# Patient Record
Sex: MALE | NOT HISPANIC OR LATINO | Employment: OTHER | URBAN - METROPOLITAN AREA
[De-identification: names, ages, dates, MRNs, and addresses within clinical notes are randomized per-mention and may not be internally consistent; named-entity substitution may affect disease eponyms.]

---

## 2023-03-03 ENCOUNTER — HOSPITAL ENCOUNTER (INPATIENT)
Facility: HOSPITAL | Age: 68
LOS: 1 days | Discharge: LEFT BEFORE DISCHARGE | DRG: 565 | End: 2023-03-03
Attending: INTERNAL MEDICINE | Admitting: INTERNAL MEDICINE
Payer: MEDICARE

## 2023-03-03 VITALS
DIASTOLIC BLOOD PRESSURE: 65 MMHG | RESPIRATION RATE: 18 BRPM | OXYGEN SATURATION: 99 % | BODY MASS INDEX: 22.65 KG/M2 | HEART RATE: 67 BPM | HEIGHT: 71 IN | TEMPERATURE: 97.6 F | WEIGHT: 161.8 LBS | SYSTOLIC BLOOD PRESSURE: 163 MMHG

## 2023-03-03 PROBLEM — R78.81 MRSA BACTEREMIA: Status: ACTIVE | Noted: 2023-03-03

## 2023-03-03 PROBLEM — M86.9 OSTEOMYELITIS: Status: ACTIVE | Noted: 2023-03-03

## 2023-03-03 PROBLEM — Z72.0 TOBACCO ABUSE: Chronic | Status: ACTIVE | Noted: 2023-03-03

## 2023-03-03 PROBLEM — B95.62 MRSA BACTEREMIA: Status: ACTIVE | Noted: 2023-03-03

## 2023-03-03 PROBLEM — E11.21 DIABETES MELLITUS WITH NEPHROPATHY: Chronic | Status: ACTIVE | Noted: 2023-03-03

## 2023-03-03 PROBLEM — E87.1 HYPONATREMIA: Status: ACTIVE | Noted: 2023-03-03

## 2023-03-03 LAB
ALBUMIN SERPL-MCNC: 2.6 G/DL (ref 3.5–5.2)
ALBUMIN/GLOB SERPL: 0.6 G/DL
ALP SERPL-CCNC: 148 U/L (ref 39–117)
ALT SERPL W P-5'-P-CCNC: 5 U/L (ref 1–41)
ANION GAP SERPL CALCULATED.3IONS-SCNC: 9 MMOL/L (ref 5–15)
AST SERPL-CCNC: 18 U/L (ref 1–40)
BASOPHILS # BLD AUTO: 0.02 10*3/MM3 (ref 0–0.2)
BASOPHILS NFR BLD AUTO: 0.4 % (ref 0–1.5)
BILIRUB SERPL-MCNC: 0.3 MG/DL (ref 0–1.2)
BUN SERPL-MCNC: 20 MG/DL (ref 8–23)
BUN/CREAT SERPL: 15.7 (ref 7–25)
CALCIUM SPEC-SCNC: 8.4 MG/DL (ref 8.6–10.5)
CHLORIDE SERPL-SCNC: 95 MMOL/L (ref 98–107)
CO2 SERPL-SCNC: 25 MMOL/L (ref 22–29)
CREAT SERPL-MCNC: 1.27 MG/DL (ref 0.76–1.27)
CRP SERPL-MCNC: 10.73 MG/DL (ref 0–0.5)
DEPRECATED RDW RBC AUTO: 47.5 FL (ref 37–54)
EGFRCR SERPLBLD CKD-EPI 2021: 61.5 ML/MIN/1.73
EOSINOPHIL # BLD AUTO: 0.01 10*3/MM3 (ref 0–0.4)
EOSINOPHIL NFR BLD AUTO: 0.2 % (ref 0.3–6.2)
ERYTHROCYTE [DISTWIDTH] IN BLOOD BY AUTOMATED COUNT: 16.6 % (ref 12.3–15.4)
ERYTHROCYTE [SEDIMENTATION RATE] IN BLOOD: 121 MM/HR (ref 0–20)
GLOBULIN UR ELPH-MCNC: 4.4 GM/DL
GLUCOSE BLDC GLUCOMTR-MCNC: 354 MG/DL (ref 70–130)
GLUCOSE SERPL-MCNC: 322 MG/DL (ref 65–99)
HBA1C MFR BLD: 10.8 % (ref 4.8–5.6)
HCT VFR BLD AUTO: 30 % (ref 37.5–51)
HGB BLD-MCNC: 9.6 G/DL (ref 13–17.7)
IMM GRANULOCYTES # BLD AUTO: 0.02 10*3/MM3 (ref 0–0.05)
IMM GRANULOCYTES NFR BLD AUTO: 0.4 % (ref 0–0.5)
INR PPP: 1.01 (ref 0.84–1.13)
LYMPHOCYTES # BLD AUTO: 1.4 10*3/MM3 (ref 0.7–3.1)
LYMPHOCYTES NFR BLD AUTO: 30.6 % (ref 19.6–45.3)
MCH RBC QN AUTO: 25.3 PG (ref 26.6–33)
MCHC RBC AUTO-ENTMCNC: 32 G/DL (ref 31.5–35.7)
MCV RBC AUTO: 79.2 FL (ref 79–97)
MONOCYTES # BLD AUTO: 0.45 10*3/MM3 (ref 0.1–0.9)
MONOCYTES NFR BLD AUTO: 9.8 % (ref 5–12)
NEUTROPHILS NFR BLD AUTO: 2.67 10*3/MM3 (ref 1.7–7)
NEUTROPHILS NFR BLD AUTO: 58.6 % (ref 42.7–76)
NRBC BLD AUTO-RTO: 0 /100 WBC (ref 0–0.2)
PLATELET # BLD AUTO: 281 10*3/MM3 (ref 140–450)
PMV BLD AUTO: 9.8 FL (ref 6–12)
POTASSIUM SERPL-SCNC: 4.6 MMOL/L (ref 3.5–5.2)
PROT SERPL-MCNC: 7 G/DL (ref 6–8.5)
PROTHROMBIN TIME: 13.2 SECONDS (ref 11.4–14.4)
RBC # BLD AUTO: 3.79 10*6/MM3 (ref 4.14–5.8)
SODIUM SERPL-SCNC: 129 MMOL/L (ref 136–145)
WBC NRBC COR # BLD: 4.57 10*3/MM3 (ref 3.4–10.8)

## 2023-03-03 PROCEDURE — 99222 1ST HOSP IP/OBS MODERATE 55: CPT | Performed by: INTERNAL MEDICINE

## 2023-03-03 PROCEDURE — 82962 GLUCOSE BLOOD TEST: CPT

## 2023-03-03 PROCEDURE — 83036 HEMOGLOBIN GLYCOSYLATED A1C: CPT | Performed by: INTERNAL MEDICINE

## 2023-03-03 PROCEDURE — 85025 COMPLETE CBC W/AUTO DIFF WBC: CPT | Performed by: INTERNAL MEDICINE

## 2023-03-03 PROCEDURE — 87040 BLOOD CULTURE FOR BACTERIA: CPT | Performed by: INTERNAL MEDICINE

## 2023-03-03 PROCEDURE — 85652 RBC SED RATE AUTOMATED: CPT | Performed by: INTERNAL MEDICINE

## 2023-03-03 PROCEDURE — 85610 PROTHROMBIN TIME: CPT | Performed by: INTERNAL MEDICINE

## 2023-03-03 PROCEDURE — 86140 C-REACTIVE PROTEIN: CPT | Performed by: INTERNAL MEDICINE

## 2023-03-03 PROCEDURE — 80053 COMPREHEN METABOLIC PANEL: CPT | Performed by: INTERNAL MEDICINE

## 2023-03-03 RX ORDER — NICOTINE POLACRILEX 4 MG
15 LOZENGE BUCCAL
Status: DISCONTINUED | OUTPATIENT
Start: 2023-03-03 | End: 2023-03-03 | Stop reason: HOSPADM

## 2023-03-03 RX ORDER — ACETAMINOPHEN 650 MG/1
650 SUPPOSITORY RECTAL EVERY 4 HOURS PRN
Status: DISCONTINUED | OUTPATIENT
Start: 2023-03-03 | End: 2023-03-03 | Stop reason: HOSPADM

## 2023-03-03 RX ORDER — BISACODYL 10 MG
10 SUPPOSITORY, RECTAL RECTAL DAILY PRN
Status: DISCONTINUED | OUTPATIENT
Start: 2023-03-03 | End: 2023-03-03 | Stop reason: HOSPADM

## 2023-03-03 RX ORDER — SODIUM CHLORIDE 9 MG/ML
40 INJECTION, SOLUTION INTRAVENOUS AS NEEDED
Status: DISCONTINUED | OUTPATIENT
Start: 2023-03-03 | End: 2023-03-03 | Stop reason: HOSPADM

## 2023-03-03 RX ORDER — SODIUM CHLORIDE 0.9 % (FLUSH) 0.9 %
10 SYRINGE (ML) INJECTION EVERY 12 HOURS SCHEDULED
Status: DISCONTINUED | OUTPATIENT
Start: 2023-03-03 | End: 2023-03-03 | Stop reason: HOSPADM

## 2023-03-03 RX ORDER — ONDANSETRON 2 MG/ML
4 INJECTION INTRAMUSCULAR; INTRAVENOUS EVERY 6 HOURS PRN
Status: DISCONTINUED | OUTPATIENT
Start: 2023-03-03 | End: 2023-03-03 | Stop reason: HOSPADM

## 2023-03-03 RX ORDER — ACETAMINOPHEN 160 MG/5ML
650 SOLUTION ORAL EVERY 4 HOURS PRN
Status: DISCONTINUED | OUTPATIENT
Start: 2023-03-03 | End: 2023-03-03 | Stop reason: HOSPADM

## 2023-03-03 RX ORDER — POLYETHYLENE GLYCOL 3350 17 G/17G
17 POWDER, FOR SOLUTION ORAL DAILY PRN
Status: DISCONTINUED | OUTPATIENT
Start: 2023-03-03 | End: 2023-03-03 | Stop reason: HOSPADM

## 2023-03-03 RX ORDER — ALBUTEROL SULFATE 2.5 MG/3ML
0.63 SOLUTION RESPIRATORY (INHALATION) EVERY 6 HOURS PRN
Status: DISCONTINUED | OUTPATIENT
Start: 2023-03-03 | End: 2023-03-03 | Stop reason: HOSPADM

## 2023-03-03 RX ORDER — ACETAMINOPHEN 325 MG/1
650 TABLET ORAL EVERY 4 HOURS PRN
Status: DISCONTINUED | OUTPATIENT
Start: 2023-03-03 | End: 2023-03-03 | Stop reason: HOSPADM

## 2023-03-03 RX ORDER — CLOPIDOGREL BISULFATE 75 MG/1
75 TABLET ORAL DAILY
COMMUNITY

## 2023-03-03 RX ORDER — SODIUM CHLORIDE 9 MG/ML
75 INJECTION, SOLUTION INTRAVENOUS CONTINUOUS
Status: DISCONTINUED | OUTPATIENT
Start: 2023-03-03 | End: 2023-03-03 | Stop reason: HOSPADM

## 2023-03-03 RX ORDER — INSULIN LISPRO 100 [IU]/ML
0-7 INJECTION, SOLUTION INTRAVENOUS; SUBCUTANEOUS
Status: DISCONTINUED | OUTPATIENT
Start: 2023-03-03 | End: 2023-03-03 | Stop reason: HOSPADM

## 2023-03-03 RX ORDER — NICOTINE 21 MG/24HR
1 PATCH, TRANSDERMAL 24 HOURS TRANSDERMAL DAILY PRN
Status: DISCONTINUED | OUTPATIENT
Start: 2023-03-03 | End: 2023-03-03 | Stop reason: HOSPADM

## 2023-03-03 RX ORDER — AMOXICILLIN 250 MG
2 CAPSULE ORAL 2 TIMES DAILY
Status: DISCONTINUED | OUTPATIENT
Start: 2023-03-03 | End: 2023-03-03 | Stop reason: HOSPADM

## 2023-03-03 RX ORDER — BISACODYL 5 MG/1
5 TABLET, DELAYED RELEASE ORAL DAILY PRN
Status: DISCONTINUED | OUTPATIENT
Start: 2023-03-03 | End: 2023-03-03 | Stop reason: HOSPADM

## 2023-03-03 RX ORDER — ONDANSETRON 4 MG/1
4 TABLET, FILM COATED ORAL EVERY 6 HOURS PRN
Status: DISCONTINUED | OUTPATIENT
Start: 2023-03-03 | End: 2023-03-03 | Stop reason: HOSPADM

## 2023-03-03 RX ORDER — SODIUM CHLORIDE 0.9 % (FLUSH) 0.9 %
10 SYRINGE (ML) INJECTION AS NEEDED
Status: DISCONTINUED | OUTPATIENT
Start: 2023-03-03 | End: 2023-03-03 | Stop reason: HOSPADM

## 2023-03-03 RX ORDER — DEXTROSE MONOHYDRATE 25 G/50ML
25 INJECTION, SOLUTION INTRAVENOUS
Status: DISCONTINUED | OUTPATIENT
Start: 2023-03-03 | End: 2023-03-03 | Stop reason: HOSPADM

## 2023-03-03 NOTE — PROGRESS NOTES
"Pharmacy Consult-Vancomycin Dosing  Paulo Gonzalez is a  67 y.o. male receiving vancomycin therapy.     Indication: Bacteremia  Consulting Provider: Hospitalist  ID Consult: Yes    Goal AUC: 400 - 600 mg/L*hr    Current Antimicrobial Therapy  Anti-Infectives (From admission, onward)      Ordered     Dose/Rate Route Frequency Start Stop    03/03/23 1726  vancomycin 1500 mg/500 mL 0.9% NS IVPB ([facility])        Ordering Provider: Esteban Rivera RPH    20 mg/kg × 73.4 kg Intravenous Once 03/03/23 1815 03/03/23 1721  Pharmacy to dose vancomycin        Ordering Provider: Clinton Barahona,      Does not apply Continuous PRN 03/03/23 1721 03/08/23 1720            Allergies  Allergies as of 03/03/2023    (No Known Allergies)       Labs    Results from last 7 days   Lab Units 03/03/23  1529   BUN mg/dL 20   CREATININE mg/dL 1.27       Results from last 7 days   Lab Units 03/03/23  1529   WBC 10*3/mm3 4.57       Evaluation of Dosing     Last Dose Received in the ED/Outside Facility: None  Is Patient on Dialysis or Renal Replacement: No    Ht - 180.3 cm (71\")  Wt - 73.4 kg (161 lb 12.8 oz)    Estimated Creatinine Clearance: 57.8 mL/min (by C-G formula based on SCr of 1.27 mg/dL).    Intake & Output (last 3 days)       None            Microbiology and Radiology  Microbiology Results (last 10 days)       ** No results found for the last 240 hours. **            Reported Vancomycin Levels                         Insight[facility] AUC Calculation:    Current AUC:   N/A mg/L*hr    Predicted Steady State AUC on Current Dose: N/A mg/L*hr  _________________________________    Predicted Steady State AUC on New Dose:   488 mg/L*hr    Assessment/Plan:  1. Pharmacy to dose vancomycin for bacteremia.  2. Patient received a loading dose of vancomycin 1500 mg (20.4 mg/kg), and was started on a maintenance dose of 1250 mg (17 mg/kg) Q24H.  3. Vancomycin random scheduled for 3/6 @ 0600.   4. Monitor renal function, cultures and sensitivities, " and clinical status, and adjust regimen as necessary.    Thank you,    Esteban Rivera [facility]  3/3/2023  17:31 EST

## 2023-03-03 NOTE — NURSING NOTE
ACC REVIEW REPORT: Psychiatric        PATIENT NAME: Paulo Gonzalez    PATIENT ID: [id number]        COVID-19 ACC SCREENING       DOES THE PATIENT HAVE A FEVER GREATER THAN OR EQUAL .4: NO    IS THE PATIENT EXPERIENCING SHORTNESS OF BREATH: NO    DOES THE PATIENT HAVE A COUGH: NO  DOES THE PATIENT HAVE ANY OF THE FOLLOWING RISK FACTORS:    EXPOSURE TO SUSPECTED OR KNOWN COVID-19: NO    RECENT TRAVEL HISTORY TO ENDEMIC AREA (DOMESTIC/LOCAL): NO    IS THE PATIENT A HEALTHCARE WORKER: NO    HAS THE PATIENT EXPERIENCED A LOSS OF SENSE OF TASTE OR SMELL: NO    HAS THE PATIENT BEEN TESTED FOR COVID-19: YES    DATE TESTED: February 19TH 2023     LAB TESTING SENT TO:           BED: S373    BED TYPE: TELE    BED GIVEN TO: TRUPTI PEÑA RN    TIME BED GIVEN:     TODAY'S DATE: 3/3/2023    TRANSFER DATE: 03/03/2023    ETA:     TRANSFERRING FACILITY: [facility], [place]    TRANSFERRING FACILITY PHONE # : [phone]    TRANSFERRING PROVIDER: MANDIE RODGERS    DATE/TIME REQUEST RECEIVED: 03/03/2023     [facility] RN: AJ STEPHENSON    REPORT FROM: TRUPTI PEÑA RN     TIME REPORT TAKEN: 805    DIAGNOSIS: OSTEOMYELITIS, RIGHT FOOT    REASON FOR TRANSFER TO [facility]: HIGHER LEVEL CARE    TRANSPORTATION: PATIENT IS COMING BY CAR.     CLINICAL REASON FOR TRANSFER TO [facility]: ADMITTED LAST NIGHT ABOUT 1800 TO [facility], [place] FROM [facility].  PATIENT WANTED TO GO TO [facility] BUT THEY HAD NO BEDS.  HE HAS DIABETIC FOOT ULCERS ON BOTH FEET BUT THE RIGHT IS WORSE. DESCRIPTIONS OF THE ULCERS ARE LISTED BELOW. WOUND CARE TREATED BOTH FEET THIS MORNING.              HE IS A&O X 4 AND A FULL CODE.      HX: ESBL IN URINE, PAD, COPD, HTN., CAD, DIABETES.      HE IS COMING BY CAR.       CLINICAL INFORMATION    HEIGHT:     WEIGHT: 69.4 KG    ALLERGIES: NKDA    INFECTIOUS DISEASE: HISTORY OF HAVING ESBL IN HIS URINE.      ISOLATION:     VITAL SIGNS:   TIME: 0820  TEMP: 98.4  PULSE: 72  B/P: 189/73,   EARLIER IT /85, @ 0832, IT WAS  162/89  RESP: 20:        100% ON RMA        LAB INFORMATION: WBC-5.53, CREAT-1.43, HGB-8.8, GLUCOSE-248.     CULTURE INFORMATION:     MEDS/IV FLUIDS: # 18 LEFT AC/ NS @ 75 M./HR      CARDIAC SYSTEM:    CHEST PAIN:     RATE:     SCALE:     RHYTHM:  UNKNOWN, HE IS NOT ON TELE, EKG ON ADMISSION IS UNAVAILABLE.      Is patient taking or has patient been given any drugs that could increase bleeding? YES    DRUG:  PLAVIX 75 MG          CARDIAC NOTES: NO EKG AVAILABLE AND HE IS NOT ON TELE THERE AND [facility].        RESPIRATORY SYSTEM:    LUNG SOUNDS: CLEAR, LAST CXR WAS ON February 19TH AT [facility][note 3] AND IT WAS NORMAL.     OXYGEN: NO, RMA    O2 SAT: 100% ON RMA    IRESPIRATORY STATUS:  BREATHING EASY AND UNLABORED      CNS/MUSCULOSKELETAL    ALERT AND ORIENTED:    PERSON: YES  PLACE: YES  TIME: YES    INJURY:  WHERE:     CAT SCAN RESULTS:     MRI RESULTS:     CNS/MUSCULOSKELETAL NOTES:       GI//GY  NO BM FOR 1 WEEK, PER REPORT.    ABDOMINAL PAIN:     VOMITING:     DIARRHEA:     NAUSEA:     BOWEL SOUNDS:     OCCULT STOOL:     HEMATURIA:     NG TUBE:    SIZE:     DATE INSERTED:       ULTRASOUND RESULTS:     ACUTE ABDOMEN RESULTS:     CT SCAN RESULTS:       GI//GY NOTES:     MONTERROSO:     PAST MEDICAL HISTORY: DIABETES, CAD, HTN., PAD, COPD, DIABETIC ULCERS    OTHER SYMPTOM NOTES: RIGHT FOOT:  ALL HIS TOES ARE AMPUTATED AND HIS MID-FOOT IS GONE.  THERE IS A LARGE ULCER AT THE AMPUTATION SITE.  THEY ARE USING MONA AND MAXASORB (?) AND COVERING IT WITH A STERILE DRESSING.    LEFT FOOT:  OPEN ULCERS ON EVERY TOE AS WELL AS UNDERNEATH THE TOES.  WOUND CARE IS TREATING IT WITH BETADINE.  WOUND CARE WAS IN THIS MORNING AND TREATED BOTH FEET.    ADDITIONAL NOTES:   HE HAD PITTING EDEMA IN BOTH FEET, PULSES ARE PALPABLE.              Catalina Roman RN  3/3/2023  07:49 EST

## 2023-03-03 NOTE — PLAN OF CARE
"Goal Outcome Evaluation:                   Patient is highly agitated, he is aggressive, demanding a \"doctor\" to come see him about his leg, hospitalitis has already seen this patient, he is demanding to leave.    "

## 2023-03-03 NOTE — H&P
[facility] Medicine Services  HISTORY AND PHYSICAL    Patient Name: Paulo Gonzalez  : 1955  MRN: [id number]  Primary Care Physician: Dionna, No Known  Date of admission: 3/3/2023      Subjective   Subjective     Chief Complaint:  Transfer for osteomyelitis, MRSA bacteremia    HPI:  Paulo Gonzalez is a 67 y.o. male w/ DM2 (with peripheral neuropathy), PAD s/p prior RT TMA, HTN, COPD with ongoing tobacco use, poor healing of RT foot surgical site who was transferred from [facility] for evaluation of bacteremia/osteomyelitis.  The patient has been walking on his right foot without modification who initially presented to [facility] ED early February with fever/malaise, blood work was drawn and he was admitted for dehydration but left AMA prior to completion of admission.  Blood cultures at that time returned positive for MRSA in both sets, it appears the patient was never contacted.  He followed up with his podiatrist late February and was recommended to return to the ED, he refused despite ongoing fever and chills.  Per the chart he was contacted by his wound care clinic 2-3 days ago to schedule a dose of Dalvance, data deficit, and the patient was notified of his positive blood cultures.  He subsequently presented to OSH ED with XR of the RT foot suggestive of osteomyelitis.  He was given a dose of ceftaroline and transfer was requested.  The patient states he would rather die from sepsis than have a BKA.    Review of Systems   Gen- No fevers, chills  CV- No chest pain, palpitations  Resp- No cough, dyspnea  GI- No N/V/D, abd pain    Personal History     Past Medical History:   Diagnosis Date   • COPD (chronic obstructive pulmonary disease) ([facility])    • Diabetes mellitus (HCC)    • Hypertension    • PAD (peripheral artery disease) ([facility])              Past Surgical History:   Procedure Laterality Date   • TRANS METATARSAL AMPUTATION Right        Family History: family history not  felt contributory to current complaint    Social History:  reports that he has been smoking cigarettes. He does not have any smokeless tobacco history on file. He reports that he does not currently use alcohol. He reports that he does not use drugs.  Social History     Social History Narrative   • Not on file       Medications:  Available home medication information reviewed.  Medications Prior to Admission   Medication Sig Dispense Refill Last Dose   • clopidogrel (PLAVIX) 75 MG tablet Take 1 tablet by mouth Daily.      • metFORMIN (GLUCOPHAGE) 500 MG tablet Take 1 tablet by mouth 2 (Two) Times a Day With Meals.          No Known Allergies    Objective   Objective     Vital Signs:   Temp:  [97.6 °F (36.4 °C)] 97.6 °F (36.4 °C)  Heart Rate:  [63] 63  Resp:  [18] 18  BP: (144)/(64) 144/64       Physical Exam   Constitutional: Awake, alert, sitting up in bed in NAD  HENT: NCAT, mucous membranes moist  Respiratory: Clear to auscultation bilaterally, respiratory effort normal   Cardiovascular: RRR, palpable radial pulses  Gastrointestinal: Positive bowel sounds, soft, nontender, nondistended  Musculoskeletal: Prior RT TMA, large wound w/ some erythema  Psychiatric: Appropriate affect, cooperative  Neurologic: Speech clear and fluent, hard of hearing    Result Review:  I have personally reviewed the results from the time of this admission to 3/3/2023 13:23 EST and agree with these findings:  []  Laboratory list / accordion  []  Microbiology  []  Radiology  []  EKG/Telemetry   []  Cardiology/Vascular   []  Pathology  [x]  Old records  []  Other:  Most notable findings include: records from OSH reviewed        LAB RESULTS:                                  Microbiology Results (last 10 days)     ** No results found for the last 240 hours. **          No radiology results from the last 24 hrs        Assessment & Plan   Assessment & Plan     Active Hospital Problems    Diagnosis  POA   • **MRSA bacteremia [R78.81, B95.62]   Yes   • Osteomyelitis (HCC) [M86.9]  Yes   • Diabetes mellitus with nephropathy (HCC) [E11.21]  Yes   • Hyponatremia [E87.1]  Yes   • Tobacco abuse [Z72.0]  Yes     Summary: This is a 68 y/o male w/ DM2, PAD, COPD/tobacco abuse, HTN, prior RT TMA w/ nonhealing wound, recently at OSH who left AMA during admission process, BCx returned pos for MRA, ultimately returned to OSH ED and was transferred to our facility    Assessment/Plan    MRSA bacteremia, per outside records  Nonhealing RT TMA w/ concern for osteomyelitis  PAD  -hold plavix in the event of operative management (debridement)  -patient stating he would rather die of sepsis than have a BKA  -PT WOC and CTS consult  -per OSH report PT had 2 sets of BCx positive for MRSA ~2/19; received Teflaro at OSH prior to transfer  -have no labs or prior data, start w/ CBC, CMP, ESR, CRP, repeat BCx; will have to defer Abx until some baseline data obtained  -ID consult    DM type 2, unknown a1c, w/o long term use of insulin  -pt reported to staff that he was previously on insulin but did not like doing it so he is on metformin only  -check a1c  -hold metformin, especially in light of reported abnl renal fxn at OSH  -sliding scale    COPD  Tobacco abuse  -NRT  -prn accuneb    HTN  -pt reports not on any med for the same; OSH records suggest remotely being on lisinprol  -monitor vitals    DVT prophylaxis:  scds      CODE STATUS:    Code Status and Medical Interventions:   Ordered at: 03/03/23 1323     Code Status (Patient has no pulse and is not breathing):    CPR (Attempt to Resuscitate)     Medical Interventions (Patient has pulse or is breathing):    Full Support       Expected Discharge medically anticipate hospitalization into next week, however patient is exceedingly high risk for leaving AMA due to prior history of the same and statements expressed to needing to leave soon       Clinton Barahona, DO  03/03/23

## 2023-03-04 NOTE — DISCHARGE SUMMARY
[facility] Medicine Services  ELOPEMENT AGAINST MEDICAL ADVICE    Patient Name: Paulo Gonzalez  : 1955  MRN: [id number]    Date of Admission: 3/3/2023  Date of Elopement:  3/3/2023   Primary Care Physician: Provider, No Known    Consults     No orders found for last 30 day(s).        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          Hospital Course:  Paulo Gonzalez is a 67 y.o. male patient admitted for osteomyelitis and MRSA bacteremia (see H&P dated same date), he elected to leave AMA during night shift. Night shift APRN was notified per nursing notes and patient was taken home by family.    **Patient left AMA prior to completion of evaluation and management**      Day of Discharge     HPI:   **Patient left AMA prior to completion of evaluation and management**    Vital Signs:   Temp:  [97.6 °F (36.4 °C)] 97.6 °F (36.4 °C)  Heart Rate:  [63-67] 67  Resp:  [18] 18  BP: (144-163)/(64-65) 163/65     Physical Exam (if applicable):  n/a  Pending Labs     Order Current Status    Blood Culture - Blood, Arm, Right In process    Blood Culture - Blood, Hand, Right In process        Discharge Details     Discharge Disposition:  **Patient left AMA prior to completion of evaluation and management, therefore discharge planning remains incomplete including absence of any needed discharging medications, testing arrangements or follow up unless otherwise specified**    No future appointments.          Clinton Barahona DO  23

## 2023-03-04 NOTE — SIGNIFICANT NOTE
Patient is AOx4. States he has not seen a doctor all day and he could lay at home just like he's laying here, so he's going home. Educated on risks of leaving AMA. Patient stated he understands the risks. Notified MYLES Stark that patient is wanting to leave AMA. Patient transported home with sister and nephew.     Evelyn Chavarria RN  [facility] Charge Nurse

## 2023-03-08 LAB
BACTERIA SPEC AEROBE CULT: NORMAL
BACTERIA SPEC AEROBE CULT: NORMAL